# Patient Record
Sex: FEMALE | Race: BLACK OR AFRICAN AMERICAN | NOT HISPANIC OR LATINO | Employment: FULL TIME | URBAN - METROPOLITAN AREA
[De-identification: names, ages, dates, MRNs, and addresses within clinical notes are randomized per-mention and may not be internally consistent; named-entity substitution may affect disease eponyms.]

---

## 2020-01-29 PROBLEM — R87.610 ATYPICAL SQUAMOUS CELL CHANGES OF UNDETERMINED SIGNIFICANCE (ASCUS) ON CERVICAL CYTOLOGY WITH POSITIVE HIGH RISK HUMAN PAPILLOMA VIRUS (HPV): Status: ACTIVE | Noted: 2020-01-29

## 2020-01-29 PROBLEM — E11.9 TYPE 2 DIABETES MELLITUS WITHOUT COMPLICATION, WITHOUT LONG-TERM CURRENT USE OF INSULIN: Status: ACTIVE | Noted: 2020-01-29

## 2020-01-29 PROBLEM — N82.3 RECTOVAGINAL FISTULA: Status: ACTIVE | Noted: 2020-01-29

## 2020-01-29 PROBLEM — I10 ESSENTIAL HYPERTENSION: Status: ACTIVE | Noted: 2020-01-29

## 2020-01-29 PROBLEM — R87.810 ATYPICAL SQUAMOUS CELL CHANGES OF UNDETERMINED SIGNIFICANCE (ASCUS) ON CERVICAL CYTOLOGY WITH POSITIVE HIGH RISK HUMAN PAPILLOMA VIRUS (HPV): Status: ACTIVE | Noted: 2020-01-29

## 2020-06-26 PROBLEM — N87.9 CERVICAL DYSPLASIA: Status: ACTIVE | Noted: 2020-06-26

## 2020-07-12 ENCOUNTER — NURSE TRIAGE (OUTPATIENT)
Dept: ADMINISTRATIVE | Facility: CLINIC | Age: 40
End: 2020-07-12

## 2020-07-12 NOTE — TELEPHONE ENCOUNTER
Reason for Disposition   Caller has cancelled the call before the first contact.     Procedure cancelled. NO f/u calls required.    Additional Information   Negative: Caller is angry or rude (e.g., hangs up, verbally abusive, yelling)   Negative: Caller hangs up   Negative: Caller has already spoken with the PCP and has no further questions.   Negative: Caller has already spoken with another triager and has no further questions.   Negative: Caller has already spoken with another triager or PCP AND has further questions AND triager able to answer questions.   Negative: Busy signal.  First attempt to contact caller.  Follow-up call scheduled within 15 minutes.   Negative: No answer.  First attempt to contact caller.  Follow-up call scheduled within 15 minutes.   Negative: Message left on identified answering machine.   Negative: Message left on unidentified answering machine.  Answering service notified.   Negative: Message left with person in household.   Negative: Wrong number reached.  Answering service notified.   Negative: Second attempt to contact family AND no contact made.  Answering service notified.   Negative: Cell phone out of range.  Answering service notified.   Negative: Pager number given.  Answering service notified.   Negative: Already left for the hospital/clinic.    Protocols used: ST NO CONTACT OR DUPLICATE CONTACT CALL-A-